# Patient Record
Sex: MALE | Race: WHITE | NOT HISPANIC OR LATINO
[De-identification: names, ages, dates, MRNs, and addresses within clinical notes are randomized per-mention and may not be internally consistent; named-entity substitution may affect disease eponyms.]

---

## 2020-09-28 PROBLEM — Z00.00 ENCOUNTER FOR PREVENTIVE HEALTH EXAMINATION: Status: ACTIVE | Noted: 2020-09-28

## 2020-10-02 ENCOUNTER — APPOINTMENT (OUTPATIENT)
Dept: UROLOGY | Facility: CLINIC | Age: 45
End: 2020-10-02
Payer: COMMERCIAL

## 2020-10-02 ENCOUNTER — TRANSCRIPTION ENCOUNTER (OUTPATIENT)
Age: 45
End: 2020-10-02

## 2020-10-02 VITALS
BODY MASS INDEX: 19.88 KG/M2 | WEIGHT: 150 LBS | HEART RATE: 54 BPM | SYSTOLIC BLOOD PRESSURE: 117 MMHG | HEIGHT: 73 IN | TEMPERATURE: 98.1 F | DIASTOLIC BLOOD PRESSURE: 71 MMHG

## 2020-10-02 PROCEDURE — 99204 OFFICE O/P NEW MOD 45 MIN: CPT

## 2020-10-02 NOTE — HISTORY OF PRESENT ILLNESS
[FreeTextEntry1] : 44M generally healthy presenting with ED. nocturia x 4-5 every night. has had nocturia life long. +urinary urgency. +frequency. long standing symptoms. +incontinence. wears pads on flights. no dysuria. no hematuria. no fevers chills. +high volume fluid intake. last 18 months decreasing libido and interest. +decreasing ability to achieve or maintain erections. father with history prostate cacncer diagnosed age 64.

## 2020-10-02 NOTE — LETTER BODY
[Dear  ___] : Dear  [unfilled], [FreeTextEntry2] : Kei Espinal MD\par [address]\[place] [address] [FreeTextEntry1] : I had the pleasure of seeing your patient,  ELLIOT WEILER, a 44 year old man, in the office in consultation today. Please see the attached note for full details.\par \par Thank you very much for allowing me to participate in the care of this patient. If you have any questions please feel free to call me at any time. \par \par \par Sincerely yours,\par \par \par \par Vargas Colindres MD, JEFERSON\par Director, Male Fertility and Microsurgery\par  of Urology\par [facility]\par \par \par This letter has been dictated using computer software but not reviewed to expedite transmission.\par

## 2020-10-02 NOTE — ASSESSMENT
[FreeTextEntry1] : OAB\par UA UCX\par trial oxybutynin\par \par ED\par trial sidnefail 100\par hormonal panel\par PSA

## 2020-10-05 LAB
APPEARANCE: CLEAR
BACTERIA UR CULT: NORMAL
BACTERIA: NEGATIVE
BILIRUBIN URINE: NEGATIVE
BLOOD URINE: NEGATIVE
COLOR: NORMAL
ESTRADIOL SERPL-MCNC: 15 PG/ML
GLUCOSE QUALITATIVE U: NEGATIVE
HYALINE CASTS: 0 /LPF
KETONES URINE: NORMAL
LEUKOCYTE ESTERASE URINE: NEGATIVE
LH SERPL-ACNC: 3.1 IU/L
MICROSCOPIC-UA: NORMAL
NITRITE URINE: NEGATIVE
PH URINE: 6.5
PROTEIN URINE: NEGATIVE
PSA SERPL-MCNC: 0.23 NG/ML
RED BLOOD CELLS URINE: 0 /HPF
SPECIFIC GRAVITY URINE: 1.01
SQUAMOUS EPITHELIAL CELLS: 0 /HPF
UROBILINOGEN URINE: NORMAL
WHITE BLOOD CELLS URINE: 0 /HPF

## 2020-10-09 ENCOUNTER — TRANSCRIPTION ENCOUNTER (OUTPATIENT)
Age: 45
End: 2020-10-09

## 2020-10-14 LAB
TESTOST BND SERPL-MCNC: 9.1 PG/ML
TESTOST SERPL-MCNC: 478.1 NG/DL

## 2020-11-06 ENCOUNTER — APPOINTMENT (OUTPATIENT)
Dept: UROLOGY | Facility: CLINIC | Age: 45
End: 2020-11-06
Payer: COMMERCIAL

## 2020-11-06 VITALS — SYSTOLIC BLOOD PRESSURE: 102 MMHG | DIASTOLIC BLOOD PRESSURE: 67 MMHG | HEART RATE: 55 BPM | TEMPERATURE: 98.4 F

## 2020-11-06 PROCEDURE — 99213 OFFICE O/P EST LOW 20 MIN: CPT

## 2020-11-06 PROCEDURE — 99072 ADDL SUPL MATRL&STAF TM PHE: CPT

## 2020-11-06 NOTE — ASSESSMENT
[FreeTextEntry1] : OAB\par mild dry mouth but signficant improvement noted in symptoms\par he ismuch happier!\par will dc oxybutynin 10 \par trial detrol 4\par if change in symptoms will revert back\par f/u 6 months

## 2020-11-06 NOTE — LETTER BODY
[Dear  ___] : Dear  [unfilled], [FreeTextEntry2] : Kei Espinal MD\par [address]\[place] [address] [FreeTextEntry1] : I saw  ELLIOT WEILER back in follow up today. Please see the attached note for full details.\par \par Thank you very much for allowing me to participate in the care of this patient. If you have any questions please feel free to call me at any time. \par \par \par Sincerely yours,\par \par \par \par Vargas Colindres MD, JEFERSON\par Director, Male Fertility and Microsurgery\par  of Urology\par [facility]\par

## 2020-11-06 NOTE — HISTORY OF PRESENT ILLNESS
[FreeTextEntry1] : marked improvmeent in voiding symptoms on oxybutynin\par incontinence has improved signficnatly but still noted on rare occasion\par urgency has resolved for the most part!\par mild dry mouth\par no constipation\par good response to sildenafil

## 2020-12-10 ENCOUNTER — TRANSCRIPTION ENCOUNTER (OUTPATIENT)
Age: 45
End: 2020-12-10

## 2020-12-10 RX ORDER — TOLTERODINE TARTRATE 4 MG/1
4 CAPSULE, EXTENDED RELEASE ORAL
Qty: 30 | Refills: 11 | Status: DISCONTINUED | COMMUNITY
Start: 2020-11-06 | End: 2020-12-10

## 2020-12-11 ENCOUNTER — TRANSCRIPTION ENCOUNTER (OUTPATIENT)
Age: 45
End: 2020-12-11

## 2020-12-16 ENCOUNTER — TRANSCRIPTION ENCOUNTER (OUTPATIENT)
Age: 45
End: 2020-12-16

## 2020-12-18 ENCOUNTER — TRANSCRIPTION ENCOUNTER (OUTPATIENT)
Age: 45
End: 2020-12-18

## 2020-12-24 ENCOUNTER — TRANSCRIPTION ENCOUNTER (OUTPATIENT)
Age: 45
End: 2020-12-24

## 2021-05-07 ENCOUNTER — APPOINTMENT (OUTPATIENT)
Dept: UROLOGY | Facility: CLINIC | Age: 46
End: 2021-05-07
Payer: COMMERCIAL

## 2021-05-07 VITALS — DIASTOLIC BLOOD PRESSURE: 71 MMHG | HEART RATE: 54 BPM | TEMPERATURE: 97.7 F | SYSTOLIC BLOOD PRESSURE: 114 MMHG

## 2021-05-07 PROCEDURE — 99072 ADDL SUPL MATRL&STAF TM PHE: CPT

## 2021-05-07 PROCEDURE — 99213 OFFICE O/P EST LOW 20 MIN: CPT

## 2021-05-07 NOTE — HISTORY OF PRESENT ILLNESS
[FreeTextEntry1] : returns for follow up\par on oxybutyinn 10\par inititially had a good response\par dry mouth has resolved\par complains of slowing of stream and increased urgency\par urgency is much better but persistent\par PSA 0.23 10/20

## 2021-05-07 NOTE — ASSESSMENT
[FreeTextEntry1] : OAB\par overall better\par will try increased dose oxybutynin 15\par UA UCX\par f/u 4-6 weeks

## 2021-05-07 NOTE — LETTER BODY
[Dear  ___] : Dear  [unfilled], [FreeTextEntry2] : Kei Espinal MD\par [address]\[place] [address] [FreeTextEntry1] : I saw  ELLIOT WEILER back in follow up today. Please see the attached note for full details.\par \par Thank you very much for allowing me to participate in the care of this patient. If you have any questions please feel free to call me at any time. \par \par \par Sincerely yours,\par \par \par \par Vargas Colindres MD, JEFERSON\par Director, Male Fertility and Microsurgery\par  of Urology\par [facility]\par

## 2021-05-10 LAB
APPEARANCE: CLEAR
BACTERIA UR CULT: NORMAL
BACTERIA: NEGATIVE
BILIRUBIN URINE: NEGATIVE
BLOOD URINE: NEGATIVE
COLOR: NORMAL
GLUCOSE QUALITATIVE U: NEGATIVE
HYALINE CASTS: 0 /LPF
KETONES URINE: NEGATIVE
LEUKOCYTE ESTERASE URINE: NEGATIVE
MICROSCOPIC-UA: NORMAL
NITRITE URINE: NEGATIVE
PH URINE: 7
PROTEIN URINE: NEGATIVE
RED BLOOD CELLS URINE: 1 /HPF
SPECIFIC GRAVITY URINE: 1.01
SQUAMOUS EPITHELIAL CELLS: 0 /HPF
UROBILINOGEN URINE: NORMAL
WHITE BLOOD CELLS URINE: 0 /HPF

## 2021-05-13 ENCOUNTER — NON-APPOINTMENT (OUTPATIENT)
Age: 46
End: 2021-05-13

## 2021-06-08 ENCOUNTER — APPOINTMENT (OUTPATIENT)
Dept: UROLOGY | Facility: CLINIC | Age: 46
End: 2021-06-08
Payer: COMMERCIAL

## 2021-06-08 VITALS
SYSTOLIC BLOOD PRESSURE: 114 MMHG | DIASTOLIC BLOOD PRESSURE: 75 MMHG | TEMPERATURE: 97.9 F | WEIGHT: 143 LBS | HEART RATE: 61 BPM | HEIGHT: 73 IN | BODY MASS INDEX: 18.95 KG/M2

## 2021-06-08 PROCEDURE — 99213 OFFICE O/P EST LOW 20 MIN: CPT

## 2021-06-08 PROCEDURE — 99072 ADDL SUPL MATRL&STAF TM PHE: CPT

## 2021-06-08 RX ORDER — OXYBUTYNIN CHLORIDE 15 MG/1
15 TABLET, EXTENDED RELEASE ORAL
Qty: 90 | Refills: 3 | Status: DISCONTINUED | COMMUNITY
Start: 2020-10-02 | End: 2021-06-08

## 2021-06-08 NOTE — ASSESSMENT
[FreeTextEntry1] : OAB\par severe side effects with anticholinergic\par will dc\par trial myrbetriq\par if not covered will start detrol la 4mg\par f/u 3 months\par risk HTN reviewed

## 2021-06-08 NOTE — HISTORY OF PRESENT ILLNESS
[FreeTextEntry1] : on oxybutynin 15\par symptomatically better\par ?blurred vision\par concerned about memory\par +dry mouth\par +side effects\par generally far more comfortable than he had been

## 2021-07-16 ENCOUNTER — TRANSCRIPTION ENCOUNTER (OUTPATIENT)
Age: 46
End: 2021-07-16

## 2021-07-21 ENCOUNTER — TRANSCRIPTION ENCOUNTER (OUTPATIENT)
Age: 46
End: 2021-07-21

## 2021-09-17 ENCOUNTER — APPOINTMENT (OUTPATIENT)
Dept: UROLOGY | Facility: CLINIC | Age: 46
End: 2021-09-17
Payer: COMMERCIAL

## 2021-09-17 VITALS — HEART RATE: 60 BPM | SYSTOLIC BLOOD PRESSURE: 121 MMHG | DIASTOLIC BLOOD PRESSURE: 75 MMHG | TEMPERATURE: 97.8 F

## 2021-09-17 PROCEDURE — 99212 OFFICE O/P EST SF 10 MIN: CPT

## 2021-09-17 NOTE — ASSESSMENT
[FreeTextEntry1] : AOB \par improved symptoms \par Continue Myrbetriq, no s/e , tolerating well\par Feels good\par monitor for HTN \par \par PSA screening today . \par \par follow up annually

## 2021-09-17 NOTE — HISTORY OF PRESENT ILLNESS
[None] : None [FreeTextEntry1] : 45 yr old male with OAB\par returns for follow up \par improved symptoms \par Currently on Myrbetriq  with no side effects\par voiding well\par Denies dysuria, hematuria, flank pain\par \par PSA 0.23 Oct 2020

## 2021-09-17 NOTE — END OF VISIT
[FreeTextEntry3] : I have seen, evaluated and formulated a plan of care after James Wang NP saw the patient. I have edited the note above to reflect my recommendations and agree with the documentation and plan as set forth.\par

## 2021-10-11 LAB
PSA FREE FLD-MCNC: 50 %
PSA FREE SERPL-MCNC: 0.09 NG/ML
PSA SERPL-MCNC: 0.17 NG/ML

## 2022-01-10 ENCOUNTER — TRANSCRIPTION ENCOUNTER (OUTPATIENT)
Age: 47
End: 2022-01-10

## 2022-08-16 ENCOUNTER — TRANSCRIPTION ENCOUNTER (OUTPATIENT)
Age: 47
End: 2022-08-16

## 2022-10-20 ENCOUNTER — APPOINTMENT (OUTPATIENT)
Dept: UROLOGY | Facility: CLINIC | Age: 47
End: 2022-10-20

## 2022-10-20 VITALS — TEMPERATURE: 98 F | SYSTOLIC BLOOD PRESSURE: 131 MMHG | DIASTOLIC BLOOD PRESSURE: 87 MMHG | HEART RATE: 60 BPM

## 2022-10-20 PROCEDURE — 99214 OFFICE O/P EST MOD 30 MIN: CPT

## 2022-10-20 NOTE — ASSESSMENT
[FreeTextEntry1] : OAB\par consider drug holiday\par continue myrbetriq\par paternal history Cap\par UA UCX PSA\par f/u 1 year

## 2022-10-24 LAB
APPEARANCE: CLEAR
BACTERIA UR CULT: NORMAL
BACTERIA: NEGATIVE
BILIRUBIN URINE: NEGATIVE
BLOOD URINE: NEGATIVE
COLOR: NORMAL
GLUCOSE QUALITATIVE U: NEGATIVE
HYALINE CASTS: 0 /LPF
KETONES URINE: NEGATIVE
LEUKOCYTE ESTERASE URINE: NEGATIVE
MICROSCOPIC-UA: NORMAL
NITRITE URINE: NEGATIVE
PH URINE: 7
PROTEIN URINE: NEGATIVE
PSA SERPL-MCNC: 0.18 NG/ML
RED BLOOD CELLS URINE: 0 /HPF
SPECIFIC GRAVITY URINE: 1.01
SQUAMOUS EPITHELIAL CELLS: 0 /HPF
UROBILINOGEN URINE: NORMAL
WHITE BLOOD CELLS URINE: 0 /HPF

## 2023-10-20 ENCOUNTER — APPOINTMENT (OUTPATIENT)
Dept: UROLOGY | Facility: CLINIC | Age: 48
End: 2023-10-20
Payer: COMMERCIAL

## 2023-10-20 VITALS
HEIGHT: 73 IN | WEIGHT: 143 LBS | DIASTOLIC BLOOD PRESSURE: 66 MMHG | SYSTOLIC BLOOD PRESSURE: 109 MMHG | BODY MASS INDEX: 18.95 KG/M2 | HEART RATE: 55 BPM | TEMPERATURE: 97.9 F

## 2023-10-20 PROCEDURE — 99214 OFFICE O/P EST MOD 30 MIN: CPT

## 2023-10-20 RX ORDER — SILDENAFIL 100 MG/1
100 TABLET, FILM COATED ORAL
Qty: 6 | Refills: 11 | Status: ACTIVE | COMMUNITY
Start: 2020-10-02 | End: 1900-01-01

## 2024-01-16 ENCOUNTER — TRANSCRIPTION ENCOUNTER (OUTPATIENT)
Age: 49
End: 2024-01-16

## 2024-02-07 ENCOUNTER — TRANSCRIPTION ENCOUNTER (OUTPATIENT)
Age: 49
End: 2024-02-07

## 2024-02-07 RX ORDER — MIRABEGRON 50 MG/1
50 TABLET, FILM COATED, EXTENDED RELEASE ORAL
Qty: 90 | Refills: 3 | Status: ACTIVE | COMMUNITY
Start: 2021-06-08 | End: 1900-01-01

## 2024-04-18 ENCOUNTER — TRANSCRIPTION ENCOUNTER (OUTPATIENT)
Age: 49
End: 2024-04-18

## 2024-04-19 ENCOUNTER — TRANSCRIPTION ENCOUNTER (OUTPATIENT)
Age: 49
End: 2024-04-19

## 2024-04-19 ENCOUNTER — APPOINTMENT (OUTPATIENT)
Dept: UROLOGY | Facility: CLINIC | Age: 49
End: 2024-04-19
Payer: COMMERCIAL

## 2024-04-19 VITALS
DIASTOLIC BLOOD PRESSURE: 62 MMHG | HEART RATE: 89 BPM | BODY MASS INDEX: 18.95 KG/M2 | TEMPERATURE: 98 F | WEIGHT: 143 LBS | HEIGHT: 73 IN | SYSTOLIC BLOOD PRESSURE: 101 MMHG

## 2024-04-19 DIAGNOSIS — N32.81 OVERACTIVE BLADDER: ICD-10-CM

## 2024-04-19 PROCEDURE — 99214 OFFICE O/P EST MOD 30 MIN: CPT | Mod: 25

## 2024-04-19 PROCEDURE — 51798 US URINE CAPACITY MEASURE: CPT

## 2024-04-20 NOTE — END OF VISIT
[FreeTextEntry3] : I, Dr. Colindres, personally performed the evaluation and management (E/M) services for this established patient who presents today with (a) new problem(s)/exacerbation of (an) existing condition(s). That E/M includes conducting the clinically appropriate interval history &/or exam, assessing all new/exacerbated conditions, and establishing a new plan of care. Today, my MISA, Jose Luis Wynn, was here to observe my evaluation and management service for this new problem/exacerbated condition and follow the plan of care established by me going forward

## 2024-04-20 NOTE — ASSESSMENT
[FreeTextEntry1] : 48M here for follow up for OAB with exacerbations   OAB - reviewed with patient various options for further management including additional medication, Intravesicular Botox, and neurostimulator.  - PVR today 8 cc - oxybutynin 5 mg trial  cont myrbetriq 50 UA UCX f/u 6 weeks

## 2024-04-20 NOTE — HISTORY OF PRESENT ILLNESS
[FreeTextEntry1] : 48M here for follow up for POAB   - On myrbetriq 50 mg  - reports that the myrbetriq is not as effective as once was - exacerbation of urgency symptoms, reports much frequency with variable volumes - endorses sufficient fluid intake, mitigates intake for situations like travel etc.  - endorses leaking   Denies: strain hesitancy nocturia n/v/d fever chills dysuria hematuria flank pain  Last PSA 1/2024 0.2

## 2024-04-22 LAB
APPEARANCE: CLEAR
BACTERIA UR CULT: NORMAL
BACTERIA: NEGATIVE /HPF
BILIRUBIN URINE: NEGATIVE
BLOOD URINE: NEGATIVE
CAST: 0 /LPF
COLOR: YELLOW
EPITHELIAL CELLS: 0 /HPF
GLUCOSE QUALITATIVE U: NEGATIVE MG/DL
KETONES URINE: NEGATIVE MG/DL
LEUKOCYTE ESTERASE URINE: NEGATIVE
MICROSCOPIC-UA: NORMAL
NITRITE URINE: NEGATIVE
PH URINE: 8
PROTEIN URINE: NEGATIVE MG/DL
RED BLOOD CELLS URINE: 0 /HPF
SPECIFIC GRAVITY URINE: 1.01
UROBILINOGEN URINE: 0.2 MG/DL
WHITE BLOOD CELLS URINE: 0 /HPF

## 2024-04-30 ENCOUNTER — TRANSCRIPTION ENCOUNTER (OUTPATIENT)
Age: 49
End: 2024-04-30

## 2024-05-01 ENCOUNTER — TRANSCRIPTION ENCOUNTER (OUTPATIENT)
Age: 49
End: 2024-05-01

## 2024-05-01 RX ORDER — OXYBUTYNIN CHLORIDE 5 MG/1
5 TABLET, EXTENDED RELEASE ORAL DAILY
Qty: 90 | Refills: 3 | Status: ACTIVE | COMMUNITY
Start: 2024-04-19 | End: 1900-01-01

## 2024-05-17 ENCOUNTER — APPOINTMENT (OUTPATIENT)
Dept: UROLOGY | Facility: CLINIC | Age: 49
End: 2024-05-17

## 2024-08-26 ENCOUNTER — RX RENEWAL (OUTPATIENT)
Age: 49
End: 2024-08-26

## 2024-10-16 ENCOUNTER — APPOINTMENT (OUTPATIENT)
Dept: UROLOGY | Facility: CLINIC | Age: 49
End: 2024-10-16

## 2025-02-10 ENCOUNTER — TRANSCRIPTION ENCOUNTER (OUTPATIENT)
Age: 50
End: 2025-02-10